# Patient Record
Sex: FEMALE | Race: WHITE | URBAN - NONMETROPOLITAN AREA
[De-identification: names, ages, dates, MRNs, and addresses within clinical notes are randomized per-mention and may not be internally consistent; named-entity substitution may affect disease eponyms.]

---

## 2021-04-20 ENCOUNTER — OFFICE VISIT (OUTPATIENT)
Dept: URBAN - NONMETROPOLITAN AREA CLINIC 2 | Facility: CLINIC | Age: 31
End: 2021-04-20

## 2021-06-22 ENCOUNTER — OFFICE VISIT (OUTPATIENT)
Dept: URBAN - NONMETROPOLITAN AREA CLINIC 2 | Facility: CLINIC | Age: 31
End: 2021-06-22

## 2022-08-17 ENCOUNTER — OFFICE VISIT (OUTPATIENT)
Dept: URBAN - METROPOLITAN AREA CLINIC 54 | Facility: CLINIC | Age: 32
End: 2022-08-17
Payer: COMMERCIAL

## 2022-08-17 ENCOUNTER — WEB ENCOUNTER (OUTPATIENT)
Dept: URBAN - METROPOLITAN AREA CLINIC 54 | Facility: CLINIC | Age: 32
End: 2022-08-17

## 2022-08-17 VITALS
BODY MASS INDEX: 22.61 KG/M2 | HEIGHT: 63 IN | TEMPERATURE: 97.7 F | DIASTOLIC BLOOD PRESSURE: 88 MMHG | SYSTOLIC BLOOD PRESSURE: 135 MMHG | HEART RATE: 93 BPM | WEIGHT: 127.6 LBS

## 2022-08-17 DIAGNOSIS — R14.0 ABDOMINAL BLOATING: ICD-10-CM

## 2022-08-17 PROCEDURE — 99203 OFFICE O/P NEW LOW 30 MIN: CPT | Performed by: STUDENT IN AN ORGANIZED HEALTH CARE EDUCATION/TRAINING PROGRAM

## 2022-08-17 RX ORDER — FLUTICASONE PROPIONATE 50 UG/1
SPRAY, METERED NASAL
Qty: 0 | Refills: 0 | Status: ACTIVE | COMMUNITY
Start: 1900-01-01

## 2022-08-17 RX ORDER — PANTOPRAZOLE SODIUM 40 MG/1
1 TABLET TABLET, DELAYED RELEASE ORAL ONCE A DAY
Qty: 30 | Refills: 2 | OUTPATIENT
Start: 2022-08-17

## 2022-08-17 NOTE — HPI-TODAY'S VISIT:
Ms. Ojeda is a 32-year-old woman with a history of constipation, hysterectomy, multiple urinary tract infections and kidney stones who presents today for complaint of bloating.  Patient has been seen by our [place] practice in the past and has had bloating.  Per records, this is resolved with MiraLAX.  Despite taking MiraLAX daily, patient continues to have bloating symptoms.  She does have 1 bowel movement a day and denies constipation.  Patient states that the symptoms are started since approximately October.  She is says she is gained approximately 10 pounds since that time.  She denies that it is associated with food.  It is mildly improved with defecation.  Denies any nausea or vomiting.  She does state that she eats food that makes her gassy like brocolli.  Patient has never had upper endoscopy.

## 2022-08-17 NOTE — EXAM-PHYSICAL EXAM
General: Well appearing. No acute distress. HEENT: Anicteric sclerae Cardiovascular: Normal heart rate Respiratory: Breathing comfortably without conversational dyspnea Abdomen:  non-distended, soft, mild periumbilical tenderness.  Tympanic to percussion in upper abdomen.  Dull to percussion in lower abdomen. Rectal: Deferred Skin: without visible rashes on examined areas. Musculoskeletal: ambulated without difficulty. Can stand from sitting position without assistance. Neuro: No gross focal deficits. Alert and oriented. Psych: Appropriate mood and affect.

## 2022-09-21 ENCOUNTER — OFFICE VISIT (OUTPATIENT)
Dept: URBAN - METROPOLITAN AREA CLINIC 54 | Facility: CLINIC | Age: 32
End: 2022-09-21

## 2022-09-21 RX ORDER — PANTOPRAZOLE SODIUM 40 MG/1
1 TABLET TABLET, DELAYED RELEASE ORAL ONCE A DAY
Qty: 30 | Refills: 2 | COMMUNITY
Start: 2022-08-17

## 2022-09-21 RX ORDER — FLUTICASONE PROPIONATE 50 UG/1
SPRAY, METERED NASAL
Qty: 0 | Refills: 0 | COMMUNITY
Start: 1900-01-01

## 2022-10-28 ENCOUNTER — OFFICE VISIT (OUTPATIENT)
Dept: URBAN - METROPOLITAN AREA CLINIC 54 | Facility: CLINIC | Age: 32
End: 2022-10-28

## 2022-11-02 ENCOUNTER — OFFICE VISIT (OUTPATIENT)
Dept: URBAN - METROPOLITAN AREA CLINIC 54 | Facility: CLINIC | Age: 32
End: 2022-11-02

## 2023-01-19 ENCOUNTER — OFFICE VISIT (OUTPATIENT)
Dept: URBAN - METROPOLITAN AREA CLINIC 54 | Facility: CLINIC | Age: 33
End: 2023-01-19

## 2023-01-19 RX ORDER — PANTOPRAZOLE SODIUM 40 MG/1
1 TABLET TABLET, DELAYED RELEASE ORAL ONCE A DAY
Qty: 30 | Refills: 2 | COMMUNITY
Start: 2022-08-17

## 2023-01-19 RX ORDER — FLUTICASONE PROPIONATE 50 UG/1
SPRAY, METERED NASAL
Qty: 0 | Refills: 0 | COMMUNITY
Start: 1900-01-01

## 2023-01-19 NOTE — HPI-OTHER HISTORIES
August 2022: Ms. Ojeda is a 32-year-old woman with a history of constipation, hysterectomy, multiple urinary tract infections and kidney stones who presents today for complaint of bloating.  Patient has been seen by our [place] practice in the past and has had bloating.  Per records, this is resolved with MiraLAX.  Despite taking MiraLAX daily, patient continues to have bloating symptoms.  She does have 1 bowel movement a day and denies constipation.  Patient states that the symptoms are started since approximately October.  She is says she is gained approximately 10 pounds since that time.  She denies that it is associated with food.  It is mildly improved with defecation.  Denies any nausea or vomiting.  She does state that she eats food that makes her gassy like brocolli.  Patient has never had upper endoscopy.

## 2023-05-31 ENCOUNTER — DASHBOARD ENCOUNTERS (OUTPATIENT)
Age: 33
End: 2023-05-31

## 2023-05-31 ENCOUNTER — OFFICE VISIT (OUTPATIENT)
Dept: URBAN - METROPOLITAN AREA CLINIC 54 | Facility: CLINIC | Age: 33
End: 2023-05-31
Payer: COMMERCIAL

## 2023-05-31 VITALS
TEMPERATURE: 97.6 F | HEIGHT: 63 IN | DIASTOLIC BLOOD PRESSURE: 101 MMHG | HEART RATE: 76 BPM | WEIGHT: 128.2 LBS | SYSTOLIC BLOOD PRESSURE: 154 MMHG | BODY MASS INDEX: 22.71 KG/M2

## 2023-05-31 DIAGNOSIS — K57.90 DIVERTICULOSIS: ICD-10-CM

## 2023-05-31 DIAGNOSIS — Z71.9 HEALTH EDUCATION: ICD-10-CM

## 2023-05-31 DIAGNOSIS — R14.0 BLOATING: ICD-10-CM

## 2023-05-31 PROBLEM — 397881000: Status: ACTIVE | Noted: 2023-05-31

## 2023-05-31 PROCEDURE — 99203 OFFICE O/P NEW LOW 30 MIN: CPT | Performed by: STUDENT IN AN ORGANIZED HEALTH CARE EDUCATION/TRAINING PROGRAM

## 2023-05-31 RX ORDER — METHENAMINE HIPPURATE 1 G/1
1 TABLET TABLET ORAL TWICE A DAY
Status: ON HOLD | COMMUNITY

## 2023-05-31 RX ORDER — FEXOFENADINE HYDROCHLORIDE 180 MG/1
1 TABLET SWALLOW WHOLE WITH WATER; DO NOT TAKE WITH FRUIT JUICES TABLET ORAL ONCE A DAY
Status: ON HOLD | COMMUNITY

## 2023-05-31 RX ORDER — PANTOPRAZOLE SODIUM 40 MG/1
1 TABLET TABLET, DELAYED RELEASE ORAL ONCE A DAY
Qty: 30 | Refills: 2 | Status: ON HOLD | COMMUNITY
Start: 2022-08-17

## 2023-05-31 RX ORDER — PROPRANOLOL HYDROCHLORIDE 10 MG/1
1 TABLET TABLET ORAL TWICE A DAY
Status: ACTIVE | COMMUNITY

## 2023-05-31 RX ORDER — FLUTICASONE PROPIONATE 50 UG/1
SPRAY, METERED NASAL
Qty: 0 | Refills: 0 | Status: ACTIVE | COMMUNITY
Start: 1900-01-01

## 2023-05-31 RX ORDER — AMITRIPTYLINE HYDROCHLORIDE 25 MG/1
1 TABLET AT BEDTIME TABLET, FILM COATED ORAL ONCE A DAY
Status: ON HOLD | COMMUNITY

## 2023-05-31 NOTE — HPI-TODAY'S VISIT:
Ms Ojeda is a 33 yoF who presents to discuss diverticulosis. Patient had CT abdomen for abdominal pain which showed a kidney stone but also had incidental finding of diverticulosis. Patient is interested in learning more about what she can do to prevent diveriticulitis and bloating and weight gain.  She denies any abdominal pain currently.